# Patient Record
Sex: MALE | HISPANIC OR LATINO
[De-identification: names, ages, dates, MRNs, and addresses within clinical notes are randomized per-mention and may not be internally consistent; named-entity substitution may affect disease eponyms.]

---

## 2023-08-02 ENCOUNTER — APPOINTMENT (OUTPATIENT)
Dept: ORTHOPEDIC SURGERY | Facility: CLINIC | Age: 38
End: 2023-08-02
Payer: MEDICAID

## 2023-08-02 ENCOUNTER — NON-APPOINTMENT (OUTPATIENT)
Age: 38
End: 2023-08-02

## 2023-08-02 VITALS — HEIGHT: 67 IN | WEIGHT: 185 LBS | BODY MASS INDEX: 29.03 KG/M2

## 2023-08-02 DIAGNOSIS — M25.562 PAIN IN LEFT KNEE: ICD-10-CM

## 2023-08-02 DIAGNOSIS — F17.200 NICOTINE DEPENDENCE, UNSPECIFIED, UNCOMPLICATED: ICD-10-CM

## 2023-08-02 DIAGNOSIS — M54.50 LOW BACK PAIN, UNSPECIFIED: ICD-10-CM

## 2023-08-02 PROBLEM — Z00.00 ENCOUNTER FOR PREVENTIVE HEALTH EXAMINATION: Status: ACTIVE | Noted: 2023-08-02

## 2023-08-02 PROCEDURE — 99204 OFFICE O/P NEW MOD 45 MIN: CPT

## 2023-08-02 NOTE — PHYSICAL EXAM
[de-identified] : General: No acute distress, conversant, well-nourished. Head: Normocephalic, atraumatic Neck: trachea midline, FROM Heart: normotensive and normal rate and rhythm Lungs: No labored breathing Skin: No abrasions, no rashes, no edema Psych: Alert and oriented to person, place and time Extremities: no peripheral edema or digital cyanosis Gait: Normal gait. Can perform tandem gait.   Vascular: warm and well perfused distally, palpable distal pulses MSK: Right Knee with no erythema, no effusion.   No tenderness to palpation of knee. No medial joint line tenderness. No lateral joint line tenderness.  Range of motion: 0 - 130 degrees No pain with range of motion.  Negative Remington's test. Stable to varus and valgus stress. Negative Lachman's test, negative anterior and posterior drawer tests.    Sensation intact to light touch.   Normal motor exam. Warm and well perfused distally. Lumbar spine: No tenderness to palpation.  No step-off, no deformity.  NEURO EXAM: Sensation  Left L2  -  2/2             Left L3  -  2/2 Left L4  -  2/2 Left L5  -  2/2 Left S1  -  2/2  Right L2  -  2/2             Right L3  -  2/2 Right L4  -  2/2 Right L5  -  2/2 Right S1  -  2/2  Motor:  Left L2 (hip flexion)                            5/5                 Left L3 (knee extension)                   5/5                 Left L4 (ankle dorsiflexion)                 5/5                 Left L5 (long toe extensor)                5/5                 Left S1 (ankle plantar flexion)           5/5  Right L2 (hip flexion)                            5/5                 Right L3 (knee extension)                   5/5                 Right L4 (ankle dorsiflexion)                 5/5                 Right L5 (long toe extensor)                5/5                 Right S1 (ankle plantar flexion)           5/5  Reflexes: Normal and symmetric Negative clonus.  Down-going Babinski.   [de-identified] : Lumbar spine (7/31/23): no fracture or dislocation Left knee (7/31/23): no fracture or dislocation

## 2023-08-02 NOTE — HISTORY OF PRESENT ILLNESS
[de-identified] : 38 year old male presents for medical evaluation to become a .  He has a history of a left knee and back injury over 10 years ago after playing football.  He has not had pain in over 10 years.  He is very active without limitation.  He does not take any medications for pain.  He has recent images from [facility].
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
